# Patient Record
Sex: FEMALE | Employment: FULL TIME | URBAN - METROPOLITAN AREA
[De-identification: names, ages, dates, MRNs, and addresses within clinical notes are randomized per-mention and may not be internally consistent; named-entity substitution may affect disease eponyms.]

---

## 2025-03-21 ENCOUNTER — OFFICE VISIT (OUTPATIENT)
Age: 55
End: 2025-03-21

## 2025-03-21 VITALS — WEIGHT: 177 LBS

## 2025-03-21 DIAGNOSIS — M23.91 INTERNAL DERANGEMENT OF RIGHT KNEE: Primary | ICD-10-CM

## 2025-03-22 NOTE — PROGRESS NOTES
patient (or guardian, if applicable) and other individuals in attendance at the appointment consented to the use of AI, including the recording.